# Patient Record
Sex: MALE | Race: WHITE | Employment: OTHER | URBAN - METROPOLITAN AREA
[De-identification: names, ages, dates, MRNs, and addresses within clinical notes are randomized per-mention and may not be internally consistent; named-entity substitution may affect disease eponyms.]

---

## 2021-02-01 ENCOUNTER — TRANSFERRED RECORDS (OUTPATIENT)
Dept: HEALTH INFORMATION MANAGEMENT | Facility: CLINIC | Age: 61
End: 2021-02-01

## 2021-04-05 ENCOUNTER — TRANSFERRED RECORDS (OUTPATIENT)
Dept: HEALTH INFORMATION MANAGEMENT | Facility: CLINIC | Age: 61
End: 2021-04-05

## 2021-04-05 ENCOUNTER — MEDICAL CORRESPONDENCE (OUTPATIENT)
Dept: HEALTH INFORMATION MANAGEMENT | Facility: CLINIC | Age: 61
End: 2021-04-05

## 2021-06-15 ENCOUNTER — TRANSCRIBE ORDERS (OUTPATIENT)
Dept: OTHER | Age: 61
End: 2021-06-15

## 2021-06-15 DIAGNOSIS — F03.90 DEMENTIA WITHOUT BEHAVIORAL DISTURBANCE, UNSPECIFIED DEMENTIA TYPE: Primary | ICD-10-CM

## 2021-08-06 ENCOUNTER — PRE VISIT (OUTPATIENT)
Dept: NEUROLOGY | Facility: CLINIC | Age: 61
End: 2021-08-06

## 2021-08-06 NOTE — TELEPHONE ENCOUNTER
FUTURE VISIT INFORMATION      FUTURE VISIT INFORMATION:    Date: 8/26/2021    Time: 145pm    Location: [facility] [place]  REFERRAL INFORMATION:    Referring provider:  EYAL PALUMBO CNP     Referring providers clinic:  [facility]     Reason for visit/diagnosis  Dementia     RECORDS REQUESTED FROM:       Clinic name Comments Records Status Imaging Status   [facility]  MR Brain-2/1/2021 Scanned to Chart and Care Everywhere Requested to PACS                                    8/6/2021-Request for Images faxed to [facility]-MR @ 1207pm    8/24/2021-[facility] Images now in PACS-MR @ 632am

## 2021-08-25 NOTE — PROGRESS NOTES
DEPARTMENT OF NEUROLOGY  Referral for: Dementia   Patient Name:  Juan Jose Hawthorne  MRN:  [id number]    :  1960  Date of Clinic Visit:  2021  Primary Care Provider:  Gladys Javier  Referring Provider: EYAL PALUMBO CNP       ASSESSMENT AND PLAN:  Juan Jose Hawthorne is a 61 year old right-handed man with history of atrial fibrillation and left atrial thrombus on Coumadin, HFrEF, CAD s/p 3v CAB in 2020, HLD, dementia NOS, HTN, squamous cell carcinoma, tobacco use disorder, polymyositis, RA, and FLACO on CPAP who presents to my clinic for a second opinion regarding diagnosis of dementia NOS. Clinical symptoms began about 1 year ago but do not seem to be progressive. Notable neuropsychiatric features include apathy, anterograde memory loss, limited insight/judgement, and a persistent delusion (phone scams/cash payout). I reviewed the images from MR brain with and without contrast study performed 21 in PACS software. There is encephalomalacia in the superior division of the right MCA territory, presumably from prior ischemic infarct. There is mild-moderate diffuse atrophy in bilateral frontal lobes, but also in the superior parietal lobes, in right parietal more than left parietal. Temporal lobes seemed normal in size and relatively symmetric. I reviewed the results from nuropsychology testing on 21; this was notable for normal visuospatial, language, and execuive function. He had an amnestic memory profile. He had low average complex attention/working memory. Some of these findings could be attributed to his prior infarct, but notably not the anterograde memory issues or memory encoding issues. Of note, the testing was performed BEFORE his FLACO was treated with CPAP, so it is possible this was playing a role.    Overall, this is not a straightforward case, and it is quite possible that there are two processes going on at once (deficits from right MCA territory infarct + neurodegenerative  or encephalitic process). I feel additional workup is merited.     Recommendations:  - Repeat neuropsychology testing in 9-12 months from initial test   - Consider lumbar puncture if cleared by cardiology to temporarily hold anticoagulation in the setting of procedure. I would recommend obtaining the following: opening pressure, basic CSF labs (glucose, cell count, protein), CSF autoimmune encephalopathy panel (sendout to [facility]), [facility] ([facility]), serum autoimmune encephalopathy panel (sendout to [facility]), and saving 3-4 ml of CSF for 2-3 months in case additional studies need to be added on.   - Consider CT C/A/P with contrast to evaluate for a potential primary malignancy (query limbic encphalitis from paraneoplastic syndrome in the setting of potential cancer from long-term tobacco use and relatively recent diagnsosis and excision of squamous cell carcinoma of the left leg)  - Since this consultation was for a second opinion, the above studies would need to be initiated/ordered by his local primary care physician or neurology team.   - We did not discuss driving today - defer driving evaluation to PCP and LINWOOD Bird, CNP.      Patient was seen and discussed with Dr. Tinoco.     Elias Dinh MD  PGY-3 Neurology Resident  [facility]    Attending attestation: I saw and evaluated the patient on 08/26/21 with resident Dr. Lay. Mr. Hawthorne presents with one year of apathy and somewhat fixed delusions pertaining to finances and spending, as well as short term memory impairment. He is not disinhibited, impulsive, denies mood changes or depression, does not have non-motor features of parkinsonism. Work up thus far revealed a low normal B12 (being supplemented), brain MRI (with remote R frontal and L cereballar strokes per report), and neuropsychiatric testing with nonspecific amnestic pattern. Exam today notable for flat affect and impaired insight. He has diffusely brisk  "reflexes but no clonus and toes are down. There are no frontal release signs. My impression is that he has amnestic cognitive impairment with some degree of functional impairment, concerning for dementia vs. more fixed deficit due to stroke. We will try to push his most recent brain MRI to our system for review. Recommend obtaining repeat neuropsychiatry assessment at one year to assess for interval change, completion of paraneoplastic work up and CSF analysis. My changes to the recommendations are in bold.     Angela Tinoco DO   of Neurology      --------------------------------------------------------------  CHIEF COMPLAINT: Dementia     HISTORY OF PRESENT ILLNESS:  Juan Jose Hawthorne is a 61 year old man with history of atrial fibrillation and left atrial thrombus on Coumadin, HFrEF, CAD s/p 3v CAB in July 2020, HLD, dementia NOS, HTN, squamous cell carcinoma, tobacco use disorder, polymyositis, RA, and FLACO on CPAP who presents to my clinic for a second opinion regarding diagnosis of dementia NOS. He is accompanied today by his sister and guardian, Racquel, who is the primary historian. She reports he has been diagnosed with \"some kind of dementia\" but that his neurology team at [facility] is worried they are missing an alternative explanation because the \"dementia came on so quickly\".     Racquel describes two prominent concerns/changes: apathy and short-term memory loss. They began sometime in late spring or early summer of last year (around the time of his CAB). In late 2020, he started believing that he had won $7 million and was telling people this. It turned out he had fallen prey to multiple phone scams and had given them substantial sums of money (estimated by Racquel to be about $10,000) in exchange for what he thought would be a huge payout. He states that he suspected they might be scams but wanted to keep pursuing the potential payout. He now knows they were scams because his " "family reminds him.     Juan Jose feels his memory has been unchanged lately. Racquel believes that his memory problems have not progressed since about last August. She reports he has \"good days and bad days\" that are dependent on his sleep quality the night before (more good days recently because he has been wearing his CPAP) and the level of pain from his arthritis. He denies headache, blurry vision, tingling/numbness. He endorses generalized weakness from deconditioning after prolonged hospitalization last year, and also attributes this to arthritis (hands, knees, ankles are quite painful). He is reluctant to take anything stronger than over the counter pain killers, as his wife  from an opioid overdose in .     He has been retired for over one year now. He lives by himself in a house. He has a cat that he has had for 5 years and continues to take good care of the cat/feed and play with her. Racquel visits him about once a week to help with laundry, dishes, etc. She reports he is unmotivated to do things himself unless she helps him. He reports he spends most of his days next door in his brother's garage helping him with woodworking. He has not had any difficulty remembering how to do activities like woodworking. He still drives locally around his city, but his sister brings him to all the doctors' appointments because he does not remember what they say. He reports he has been retired for about 6 months now, but per chart review, it seems that he was placed on permanent leave in early  because of his memory deficits (for example, once forgot about work and showed up 2 hours late). He reports it was a struggle to get used to prison but that he is enjoying it now.     He denies symptoms of disinhibition, loss of empathy, perseverative/compulsive behaviours, hyperorality. He endorses apathy. She notices that he has started pacing the rooms more when he is in deep thought or feeling anxious about something. " "No major personality or mood changes per both of them. He has gained about 20 pounds between April 2021 and today's appointment, and he attributes this to eating poorly. His sister notes he does not eat excessively when she is around, and will in fact often go the entire day without eating, then will eat one big meal at night. They both deny that Juan Jose has any language problems or word-finding difficulty. No major changes since starting Abilify. Recently discontinued sertraline.    He denies hallucinations and movements concerning for REM sleep behavior disorder. He has been told he has low B12 in the past (was 238 on February 3rd) and is only daily oral supplement. His medications [facility] and he is now compliant with his medications because of this service.     PHYSICAL EXAMINATION:  Vitals: /76   Pulse 69   Resp 16   Wt 82.4 kg (181 lb 9.6 oz)   SpO2 98%   General: Appropriately groomed, appears stated age.   Cardiac: RRR, no obvious murmur (not currently in Afib). No carotid bruits on either side to auscultation.   Psych: Appropriate mood and affect  Neurologic Exam:    Mental status: Patient does not know why he is here at appointment today. With prompting, he is able to tell us that he sees neurology for memory problems. He is able to accurately describe both \"small picture\" and \"large picture\" concepts in picture in stroke book. Naming, repetition intact. No paraphasic or language errors. Able to follow simple commands and 2 step commands.     Language: Speech is fluent; comprehension, repetition and naming are normal.    Cranial nerves: Pupils are round and react normally to light and accommodation. Attempted to visualize fundi with direct ophthalmoscope but was unable to visualize fundi due to overlying cataracts. Visual fields are full and extraocular movements are normal. Facial strength and sensation are normal. Hearing is normal to finger " rub bilaterally. The palate rises symmetrically and there is no dysarthria. Tongue protrusion is normal.    Power: Strength is normal bilaterally (5/5) in the following muscles/groups: sternocleidomastoids, trapezius, deltoids, biceps, triceps, wrist extensors, finger extensors, finger interossei, abudctor pollicis brevis, hip flexors, quadriceps, hamstrings, gastrocnemius/soleus, and anterior tibialis.    Reflexes: Biceps, brachioradialis 2+ (very slightly brisker on the LUE compared to RUE). Triceps 2+ but slightly brisker RUE compared to LUE. Ankle jerks 2+ bilaterally. Patellar reflexes are brisk and symmetric, with no spread. Plantar responses are flexor bilaterally.    Motor/cerebellar: There are no tremors, myoclonus, or other abnormal movements. Muscle bulk and tone are normal. Subtle slight hypertonia in RUE compared to LUE. Finger-to-nose and heel-to-shin maneuvers are symmetric and normal bilaterally. Rapid alternating movements are symmetric in the extremities. There is no pronator drift in the arms.    Sensation: Sensation is intact to large (vibration, proprioception) and small fiber (pain, temperature) modalities in all four extremities. Vibration time at right big toe is 8 seconds and 13 seconds at left big toe. Proprioception at both big toes is normal.     Gait: Gait is narrow based and steady and the patient is able to walk on heels, toes and in tandem.     REVIEW OF SYSTEMS: 12-point RoS negative except as per HPI.    ALLERGIES:  Allergies   Allergen Reactions     Hmg-Coa-R Inhibitors Rash     MEDICATIONS:  Current Outpatient Medications   Medication Sig Dispense Refill     acetaminophen (TYLENOL) 500 MG tablet Take 500-1,000 mg by mouth       ARIPiprazole (ABILIFY) 2 MG tablet daily        aspirin (ASA) 81 MG EC tablet Take 81 mg by mouth daily        ezetimibe (ZETIA) 10 MG tablet daily        fluticasone (FLONASE) 50 MCG/ACT nasal spray SPRAY 2 SPRAYS IN EACH NOSTRIL EVERY DAY FOR ALLERGIES        furosemide (LASIX) 40 MG tablet daily        lisinopril (ZESTRIL) 5 MG tablet daily        LORazepam (ATIVAN) 0.5 MG tablet every 6 hours as needed        metoprolol succinate ER (TOPROL-XL) 50 MG 24 hr tablet Take 100 mg by mouth 2 tablets once a day       nitroGLYcerin (NITROSTAT) 0.4 MG sublingual tablet Place 0.4 mg under the tongue       sertraline (ZOLOFT) 50 MG tablet Take 50 mg by mouth       XARELTO ANTICOAGULANT 20 MG TABS tablet        zaleplon (SONATA) 10 MG capsule        PAST MEDICAL HISTORY:  Atrial fibrillation and left atrial thrombus on Coumadin, HFrEF, CAD s/p 3v CAB in July 2020, HLD, dementia NOS, HTN, squamous cell carcinoma, tobacco use disorder, polymyositis, RA, and FLACO      SOCIAL HISTORY:  1 PPD. Occasionally smokes marijuana, denies additional recreational drug use. Denies alcohol use.     FAMILY HISTORY:  Uncle with Alzheimer's Disease. Racquel reports their mother might have had some episodes of forgetfulness around the time she was 65 but that it did not seem progressive. Patient is . He does not have any children. Sister is guardian.

## 2021-08-26 ENCOUNTER — OFFICE VISIT (OUTPATIENT)
Dept: NEUROLOGY | Facility: CLINIC | Age: 61
End: 2021-08-26
Attending: FAMILY MEDICINE
Payer: COMMERCIAL

## 2021-08-26 VITALS
DIASTOLIC BLOOD PRESSURE: 76 MMHG | OXYGEN SATURATION: 98 % | SYSTOLIC BLOOD PRESSURE: 125 MMHG | HEART RATE: 69 BPM | RESPIRATION RATE: 16 BRPM | WEIGHT: 181.6 LBS

## 2021-08-26 DIAGNOSIS — R41.89 COGNITIVE IMPAIRMENT: Primary | ICD-10-CM

## 2021-08-26 PROCEDURE — 99203 OFFICE O/P NEW LOW 30 MIN: CPT | Mod: GC | Performed by: STUDENT IN AN ORGANIZED HEALTH CARE EDUCATION/TRAINING PROGRAM

## 2021-08-26 RX ORDER — RIVAROXABAN 20 MG/1
TABLET, FILM COATED ORAL
COMMUNITY
Start: 2021-05-17

## 2021-08-26 RX ORDER — ARIPIPRAZOLE 2 MG/1
TABLET ORAL DAILY
COMMUNITY
Start: 2021-07-19

## 2021-08-26 RX ORDER — EZETIMIBE 10 MG/1
TABLET ORAL DAILY
COMMUNITY
Start: 2021-01-27

## 2021-08-26 RX ORDER — AMOXICILLIN 500 MG/1
CAPSULE ORAL
COMMUNITY
Start: 2020-07-06 | End: 2021-08-26

## 2021-08-26 RX ORDER — NITROGLYCERIN 0.4 MG/1
0.4 TABLET SUBLINGUAL
COMMUNITY
Start: 2020-08-08

## 2021-08-26 RX ORDER — WARFARIN SODIUM 2 MG/1
TABLET ORAL
COMMUNITY
Start: 2020-10-31 | End: 2021-08-26

## 2021-08-26 RX ORDER — ACETAMINOPHEN 500 MG
500-1000 TABLET ORAL
COMMUNITY
Start: 2020-07-06

## 2021-08-26 RX ORDER — SERTRALINE HYDROCHLORIDE 25 MG/1
TABLET, FILM COATED ORAL
COMMUNITY
Start: 2021-04-05 | End: 2021-08-26

## 2021-08-26 RX ORDER — METOPROLOL SUCCINATE 50 MG/1
100 TABLET, EXTENDED RELEASE ORAL
COMMUNITY
Start: 2020-05-07

## 2021-08-26 RX ORDER — FLUTICASONE PROPIONATE 50 MCG
SPRAY, SUSPENSION (ML) NASAL
COMMUNITY
Start: 2021-06-14

## 2021-08-26 RX ORDER — LISINOPRIL 5 MG/1
TABLET ORAL DAILY
COMMUNITY
Start: 2020-11-24

## 2021-08-26 RX ORDER — ZALEPLON 10 MG/1
CAPSULE ORAL
COMMUNITY
Start: 2021-02-22

## 2021-08-26 RX ORDER — LORAZEPAM 0.5 MG/1
TABLET ORAL EVERY 6 HOURS PRN
COMMUNITY
Start: 2021-01-22

## 2021-08-26 RX ORDER — FUROSEMIDE 40 MG
TABLET ORAL DAILY
COMMUNITY
Start: 2020-12-29

## 2021-08-26 ASSESSMENT — PAIN SCALES - GENERAL: PAINLEVEL: MILD PAIN (3)

## 2021-08-26 NOTE — LETTER
2021       RE: Juan Jose Hawthorne  [address] [place] [address]     Dear Colleague,    Thank you for referring your patient, Juan Jose Hawthorne, to the [facility] NEUROLOGY [facility] [place] at [facility]. Please see a copy of my visit note below.    DEPARTMENT OF NEUROLOGY  Referral for: Dementia   Patient Name:  Juan Jose Hawthorne  MRN:  [id number]    :  1960  Date of Clinic Visit:  2021  Primary Care Provider:  Gladys Javier  Referring Provider: EYAL PALUMBO CNP       ASSESSMENT AND PLAN:  Juan Jose Hawthorne is a 61 year old right-handed man with history of atrial fibrillation and left atrial thrombus on Coumadin, HFrEF, CAD s/p 3v CAB in 2020, HLD, dementia NOS, HTN, squamous cell carcinoma, tobacco use disorder, polymyositis, RA, and FLACO on CPAP who presents to my clinic for a second opinion regarding diagnosis of dementia NOS. Clinical symptoms began about 1 year ago but do not seem to be progressive. Notable neuropsychiatric features include apathy, anterograde memory loss, limited insight/judgement, and a persistent delusion (phone scams/cash payout). I reviewed the images from MR brain with and without contrast study performed 21 in PACS software. There is encephalomalacia in the superior division of the right MCA territory, presumably from prior ischemic infarct. There is mild-moderate diffuse atrophy in bilateral frontal lobes, but also in the superior parietal lobes, in right parietal more than left parietal. Temporal lobes seemed normal in size and relatively symmetric. I reviewed the results from nuropsychology testing on 21; this was notable for normal visuospatial, language, and execuive function. He had an amnestic memory profile. He had low average complex attention/working memory. Some of these findings could be attributed to his prior infarct, but notably not the anterograde memory issues or memory  encoding issues. Of note, the testing was performed BEFORE his FLACO was treated with CPAP, so it is possible this was playing a role.    Overall, this is not a straightforward case, and it is quite possible that there are two processes going on at once (deficits from right MCA territory infarct + neurodegenerative or encephalitic process). I feel additional workup is merited.     Recommendations:  - Repeat neuropsychology testing in 9-12 months from initial test   - Consider lumbar puncture if cleared by cardiology to temporarily hold anticoagulation in the setting of procedure. I would recommend obtaining the following: opening pressure, basic CSF labs (glucose, cell count, protein), CSF autoimmune encephalopathy panel (sendout to [facility]), [facility] ([facility]), serum autoimmune encephalopathy panel (sendout to [facility]), and saving 3-4 ml of CSF for 2-3 months in case additional studies need to be added on.   - Consider CT C/A/P with contrast to evaluate for a potential primary malignancy (query limbic encphalitis from paraneoplastic syndrome in the setting of potential cancer from long-term tobacco use and relatively recent diagnsosis and excision of squamous cell carcinoma of the left leg)  - Since this consultation was for a second opinion, the above studies would need to be initiated/ordered by his local primary care physician or neurology team.   - We did not discuss driving today - defer driving evaluation to PCP and LINWOOD Bird, CNP.      Patient was seen and discussed with Dr. Tinoco.     Elias Dinh MD  PGY-3 Neurology Resident  [facility]    Attending attestation: I saw and evaluated the patient on 08/26/21 with resident Dr. Lay. Mr. Hawthorne presents with one year of apathy and somewhat fixed delusions pertaining to finances and spending, as well as short term memory impairment. He is not disinhibited, impulsive, denies mood changes or depression, does not have  "non-motor features of parkinsonism. Work up thus far revealed a low normal B12 (being supplemented), brain MRI (with remote R frontal and L cereballar strokes per report), and neuropsychiatric testing with nonspecific amnestic pattern. Exam today notable for flat affect and impaired insight. He has diffusely brisk reflexes but no clonus and toes are down. There are no frontal release signs. My impression is that he has amnestic cognitive impairment with some degree of functional impairment, concerning for dementia vs. more fixed deficit due to stroke. We will try to push his most recent brain MRI to our system for review. Recommend obtaining repeat neuropsychiatry assessment at one year to assess for interval change, completion of paraneoplastic work up and CSF analysis. My changes to the recommendations are in bold.     Angela Tinoco DO   of Neurology      --------------------------------------------------------------  CHIEF COMPLAINT: Dementia     HISTORY OF PRESENT ILLNESS:  Juan Jose Hawthorne is a 61 year old man with history of atrial fibrillation and left atrial thrombus on Coumadin, HFrEF, CAD s/p 3v CAB in July 2020, HLD, dementia NOS, HTN, squamous cell carcinoma, tobacco use disorder, polymyositis, RA, and FLACO on CPAP who presents to my clinic for a second opinion regarding diagnosis of dementia NOS. He is accompanied today by his sister and guardian, Racquel, who is the primary historian. She reports he has been diagnosed with \"some kind of dementia\" but that his neurology team at [facility] is worried they are missing an alternative explanation because the \"dementia came on so quickly\".     Racquel describes two prominent concerns/changes: apathy and short-term memory loss. They began sometime in late spring or early summer of last year (around the time of his CAB). In late 2020, he started believing that he had won $7 million and was telling people this. It turned out he had fallen " "prey to multiple phone scams and had given them substantial sums of money (estimated by Racquel to be about $10,000) in exchange for what he thought would be a huge payout. He states that he suspected they might be scams but wanted to keep pursuing the potential payout. He now knows they were scams because his family reminds him.     Juan Jose feels his memory has been unchanged lately. Racquel believes that his memory problems have not progressed since about last August. She reports he has \"good days and bad days\" that are dependent on his sleep quality the night before (more good days recently because he has been wearing his CPAP) and the level of pain from his arthritis. He denies headache, blurry vision, tingling/numbness. He endorses generalized weakness from deconditioning after prolonged hospitalization last year, and also attributes this to arthritis (hands, knees, ankles are quite painful). He is reluctant to take anything stronger than over the counter pain killers, as his wife  from an opioid overdose in .     He has been retired for over one year now. He lives by himself in a house. He has a cat that he has had for 5 years and continues to take good care of the cat/feed and play with her. Racquel visits him about once a week to help with laundry, dishes, etc. She reports he is unmotivated to do things himself unless she helps him. He reports he spends most of his days next door in his brother's garage helping him with woodworking. He has not had any difficulty remembering how to do activities like woodworking. He still drives locally around his city, but his sister brings him to all the doctors' appointments because he does not remember what they say. He reports he has been retired for about 6 months now, but per chart review, it seems that he was placed on permanent leave in early  because of his memory deficits (for example, once forgot about work and showed up 2 hours late). He reports it was a " "struggle to get used to halfway but that he is enjoying it now.     He denies symptoms of disinhibition, loss of empathy, perseverative/compulsive behaviours, hyperorality. He endorses apathy. She notices that he has started pacing the rooms more when he is in deep thought or feeling anxious about something. No major personality or mood changes per both of them. He has gained about 20 pounds between April 2021 and today's appointment, and he attributes this to eating poorly. His sister notes he does not eat excessively when she is around, and will in fact often go the entire day without eating, then will eat one big meal at night. They both deny that Juan Jose has any language problems or word-finding difficulty. No major changes since starting Abilify. Recently discontinued sertraline.    He denies hallucinations and movements concerning for REM sleep behavior disorder. He has been told he has low B12 in the past (was 238 on February 3rd) and is only daily oral supplement. His [facility] and he is now compliant with his medications because of this service.     PHYSICAL EXAMINATION:  Vitals: /76   Pulse 69   Resp 16   Wt 82.4 kg (181 lb 9.6 oz)   SpO2 98%   General: Appropriately groomed, appears stated age.   Cardiac: RRR, no obvious murmur (not currently in Afib). No carotid bruits on either side to auscultation.   Psych: Appropriate mood and affect  Neurologic Exam:    Mental status: Patient does not know why he is here at appointment today. With prompting, he is able to tell us that he sees neurology for memory problems. He is able to accurately describe both \"small picture\" and \"large picture\" concepts in picture in stroke book. Naming, repetition intact. No paraphasic or language errors. Able to follow simple commands and 2 step commands.     Language: Speech is fluent; comprehension, repetition and naming are normal.    Cranial nerves: " Pupils are round and react normally to light and accommodation. Attempted to visualize fundi with direct ophthalmoscope but was unable to visualize fundi due to overlying cataracts. Visual fields are full and extraocular movements are normal. Facial strength and sensation are normal. Hearing is normal to finger rub bilaterally. The palate rises symmetrically and there is no dysarthria. Tongue protrusion is normal.    Power: Strength is normal bilaterally (5/5) in the following muscles/groups: sternocleidomastoids, trapezius, deltoids, biceps, triceps, wrist extensors, finger extensors, finger interossei, abudctor pollicis brevis, hip flexors, quadriceps, hamstrings, gastrocnemius/soleus, and anterior tibialis.    Reflexes: Biceps, brachioradialis 2+ (very slightly brisker on the LUE compared to RUE). Triceps 2+ but slightly brisker RUE compared to LUE. Ankle jerks 2+ bilaterally. Patellar reflexes are brisk and symmetric, with no spread. Plantar responses are flexor bilaterally.    Motor/cerebellar: There are no tremors, myoclonus, or other abnormal movements. Muscle bulk and tone are normal. Subtle slight hypertonia in RUE compared to LUE. Finger-to-nose and heel-to-shin maneuvers are symmetric and normal bilaterally. Rapid alternating movements are symmetric in the extremities. There is no pronator drift in the arms.    Sensation: Sensation is intact to large (vibration, proprioception) and small fiber (pain, temperature) modalities in all four extremities. Vibration time at right big toe is 8 seconds and 13 seconds at left big toe. Proprioception at both big toes is normal.     Gait: Gait is narrow based and steady and the patient is able to walk on heels, toes and in tandem.     REVIEW OF SYSTEMS: 12-point RoS negative except as per HPI.    ALLERGIES:  Allergies   Allergen Reactions     Hmg-Coa-R Inhibitors Rash     MEDICATIONS:  Current Outpatient Medications   Medication Sig Dispense Refill     acetaminophen  (TYLENOL) 500 MG tablet Take 500-1,000 mg by mouth       ARIPiprazole (ABILIFY) 2 MG tablet daily        aspirin (ASA) 81 MG EC tablet Take 81 mg by mouth daily        ezetimibe (ZETIA) 10 MG tablet daily        fluticasone (FLONASE) 50 MCG/ACT nasal spray SPRAY 2 SPRAYS IN EACH NOSTRIL EVERY DAY FOR ALLERGIES       furosemide (LASIX) 40 MG tablet daily        lisinopril (ZESTRIL) 5 MG tablet daily        LORazepam (ATIVAN) 0.5 MG tablet every 6 hours as needed        metoprolol succinate ER (TOPROL-XL) 50 MG 24 hr tablet Take 100 mg by mouth 2 tablets once a day       nitroGLYcerin (NITROSTAT) 0.4 MG sublingual tablet Place 0.4 mg under the tongue       sertraline (ZOLOFT) 50 MG tablet Take 50 mg by mouth       XARELTO ANTICOAGULANT 20 MG TABS tablet        zaleplon (SONATA) 10 MG capsule        PAST MEDICAL HISTORY:  Atrial fibrillation and left atrial thrombus on Coumadin, HFrEF, CAD s/p 3v CAB in July 2020, HLD, dementia NOS, HTN, squamous cell carcinoma, tobacco use disorder, polymyositis, RA, and FLACO      SOCIAL HISTORY:  1 PPD. Occasionally smokes marijuana, denies additional recreational drug use. Denies alcohol use.     FAMILY HISTORY:  Uncle with Alzheimer's Disease. Racquel reports their mother might have had some episodes of forgetfulness around the time she was 65 but that it did not seem progressive. Patient is . He does not have any children. Sister is guardian.       Again, thank you for allowing me to participate in the care of your patient.      Sincerely,    Elias iDnh MD

## 2021-08-26 NOTE — PATIENT INSTRUCTIONS
It was a pleasure meeting you. We will forward our recommendations to your local providers (repeat neuropsychology test in 6 months and spinal tap/lumbar puncture if your cardiologist is OK with holding blood thinner for a few days).

## 2021-08-26 NOTE — NURSING NOTE
Chief Complaint   Patient presents with     Consult     [facility] - dementia without behavioral disturbance     Jarrett Arroyo

## 2021-08-26 NOTE — PROGRESS NOTES
Attending attestation: I saw and evaluated the patient on 08/26/21 with resident Dr. Lay. Mr. Hawthorne presents with one year of apathy and somewhat fixed delusions pertaining to finances and spending, as well as short term memory impairment. He is not disinhibited, impulsive, denies mood changes or depression, does not have non-motor features of parkinsonism. Work up thus far revealed a low normal B12 (being supplemented), brain MRI (with remote R frontal and L cereballar strokes per report), and neuropsychiatric testing with nonspecific amnestic pattern. Exam today notable for flat affect and impaired insight. He has diffusely brisk reflexes but no clonus and toes are down. There are no frontal release signs. My impression is that he has amnestic cognitive impairment with some degree of functional impairment, concerning for dementia vs. more fixed deficit due to stroke. We will try to push his most recent brain MRI to our system for review. Recommend obtaining repeat neuropsychiatry assessment at one year to assess for interval change, completion of paraneoplastic work up   Angela Tinoco DO   of Neurology